# Patient Record
Sex: MALE | Race: WHITE
[De-identification: names, ages, dates, MRNs, and addresses within clinical notes are randomized per-mention and may not be internally consistent; named-entity substitution may affect disease eponyms.]

---

## 2017-04-15 ENCOUNTER — HOSPITAL ENCOUNTER (EMERGENCY)
Dept: HOSPITAL 74 - FER | Age: 27
Discharge: HOME | End: 2017-04-15
Payer: COMMERCIAL

## 2017-04-15 VITALS — HEART RATE: 86 BPM | DIASTOLIC BLOOD PRESSURE: 88 MMHG | SYSTOLIC BLOOD PRESSURE: 131 MMHG | TEMPERATURE: 97.8 F

## 2017-04-15 VITALS — BODY MASS INDEX: 31.4 KG/M2

## 2017-04-15 DIAGNOSIS — S82.831A: Primary | ICD-10-CM

## 2017-04-15 PROCEDURE — 2W3QX1Z IMMOBILIZATION OF RIGHT LOWER LEG USING SPLINT: ICD-10-PCS

## 2017-04-15 NOTE — PDOC
History of Present Illness





- General


Chief Complaint: Pain, Acute


Stated Complaint: RIGHT ANKLE PAIN


Time Seen by Provider: 04/15/17 10:44


History Source: Patient


Exam Limitations: No Limitations





- History of Present Illness


Timing/Duration: 4-6 hours


Severity: moderate


Modifying Factors: improves with: cold therapy


Associated Symptoms: reports: denies symptoms





Past History





- Past Medical History


Allergies/Adverse Reactions: 


 Allergies











Allergy/AdvReac Type Severity Reaction Status Date / Time


 


Penicillins Allergy   Verified 04/15/17 10:33











Home Medications: 


Ambulatory Orders





Ondansetron [Ondansetron Odt] 8 mg PO TID #30 tab.rapdis 04/15/17 


Oxycodone HCl/Acetaminophen [Percocet 5-325 mg Tablet] 1 - 2 tab PO Q4H #20 

tablet MDD 6 04/15/17 








Other medical history: DENIES





- Psycho/Social/Smoking Cessation Hx


Anxiety: No


Suicidal Ideation: No


Smoking History: Never smoked


Hx Alcohol Use: No


Drug/Substance Use Hx: No


Substance Use Type: None





**Review of Systems





- Review of Systems


Able to Perform ROS?: Yes


Is the patient limited English proficient: No


Constitutional: No: Symptoms Reported


HEENTM: No: Symptoms Reported


Respiratory: No: Symptoms reported


Cardiac (ROS): No: Symptoms Reported


ABD/GI: No: Symptoms Reported


: No: Symptoms Reported


Musculoskeletal: Yes: See HPI


Integumentary: Yes: See HPI


Neurological: No: Symptoms reported


Psychiatric: Yes: Anxiety, Depression


Endocrine: No: Symptoms Reported


Hematologic/Lymphatic: No: Symptoms Reported


All Other Systems: Reviewed and Negative





*Physical Exam





- Vital Signs


 Last Vital Signs











Temp Pulse Resp BP Pulse Ox


 


 97.8 F   86   16   131/88   98 


 


 04/15/17 10:32  04/15/17 10:32  04/15/17 10:32  04/15/17 10:32  04/15/17 10:32














- Physical Exam


General Appearance: Yes: Nourished


HEENT: positive: EOMI, ANDREA, Normal ENT Inspection, Normal Voice


Neck: positive: Trachea midline, Supple.  negative: Tender


Respiratory/Chest: positive: Lungs Clear, Normal Breath Sounds.  negative: 

Chest Tender


Cardiovascular: positive: Regular Rhythm, Regular Rate.  negative: Murmur


Gastrointestinal/Abdominal: positive: Normal Bowel Sounds, Flat, Soft.  negative

: Tender


Rectal Exam: positive: deferred


Lymphatic: negative: Adenopathy, Tenderness


Musculoskeletal: positive: Other (Lots of Lateral Soft Tissue Selling/ 

Discoloration.  Patient is expedriencing enough pain so that he can not really 

submit to testing for ligamentous integrity.)


Extremity: positive: Normal Capillary Refill, Tender.  negative: Coldness, 

Cyanosis


Integumentary: positive: Ecchymosis, Bruising





Medical Decision Making





- Medical Decision Making





04/15/17 11:35


Posterior Splint applied without incident.





*DC/Admit/Observation/Transfer


Diagnosis at time of Disposition: 


Fracture of distal fibula


Qualifiers:


 Encounter type: initial encounter Fracture type: closed Laterality: right 





- Discharge Dispostion


Disposition: HOME


Condition at time of disposition: Stable





- Prescriptions


Prescriptions: 


Oxycodone HCl/Acetaminophen [Percocet 5-325 mg Tablet] 1 - 2 tab PO Q4H #20 

tablet MDD 6


Ondansetron [Ondansetron Odt] 8 mg PO TID #30 tab.rapdis





- Referrals


Referrals: 


Angela Olivares MD [Primary Care Provider] - 


Kevin Dinero MD [Staff Physician] - 





- Patient Instructions


Printed Discharge Instructions:  DI for Ankle Fracture


Additional Instructions: 


Ace-





So Sorry that you broke your ankle.  LEAVE THE SPLINT ON until the orthopec 

doctor removes it when you go for follow up..  Return to us if any problems 

wwhatsoever.








Aime-


Dr. Amanuel White





- Post Discharge Activity


Work/School Note:  Back to Work

## 2017-05-31 ENCOUNTER — HOSPITAL ENCOUNTER (EMERGENCY)
Dept: HOSPITAL 74 - FER | Age: 27
Discharge: HOME | End: 2017-05-31
Payer: COMMERCIAL

## 2017-05-31 VITALS — DIASTOLIC BLOOD PRESSURE: 86 MMHG | TEMPERATURE: 98 F | SYSTOLIC BLOOD PRESSURE: 139 MMHG | HEART RATE: 104 BPM

## 2017-05-31 VITALS — BODY MASS INDEX: 33.3 KG/M2

## 2017-05-31 DIAGNOSIS — R19.7: Primary | ICD-10-CM

## 2017-05-31 LAB
ALBUMIN SERPL-MCNC: 3.9 G/DL (ref 3.4–5)
ALP SERPL-CCNC: 165 U/L (ref 45–117)
ALT SERPL-CCNC: 43 U/L (ref 12–78)
ANION GAP SERPL CALC-SCNC: 16 MMOL/L (ref 8–16)
AST SERPL-CCNC: 22 U/L (ref 15–37)
BASOPHILS # BLD: 0.8 % (ref 0–2)
BILIRUB SERPL-MCNC: 0.5 MG/DL (ref 0.2–1)
CALCIUM SERPL-MCNC: 9.3 MG/DL (ref 8.5–10.1)
CO2 SERPL-SCNC: 21 MMOL/L (ref 21–32)
COCKROFT - GAULT: 117.59
CREAT SERPL-MCNC: 1.2 MG/DL (ref 0.7–1.3)
DEPRECATED RDW RBC AUTO: 15.4 % (ref 11.9–15.9)
EOSINOPHIL # BLD: 0.1 % (ref 0–4.5)
GLUCOSE SERPL-MCNC: 100 MG/DL (ref 74–106)
INR BLD: 1.32 (ref 0.82–1.09)
MCH RBC QN AUTO: 26.4 PG (ref 25.7–33.7)
MCHC RBC AUTO-ENTMCNC: 32.2 G/DL (ref 32–35.9)
MCV RBC: 81.8 FL (ref 80–96)
NEUTROPHILS # BLD: 79.9 % (ref 42.8–82.8)
PLATELET # BLD AUTO: 175 K/MM3 (ref 134–434)
PMV BLD: 10.7 FL (ref 7.5–11.1)
PROT SERPL-MCNC: 8.2 G/DL (ref 6.4–8.2)
PT PNL PPP: 14.6 SEC (ref 9.98–11.88)
WBC # BLD AUTO: 17.8 K/MM3 (ref 4–10)

## 2017-05-31 NOTE — PDOC
History of Present Illness





- General


Chief Complaint: Diarrhea


Stated Complaint: DIARRHEA X 24HRS


Time Seen by Provider: 05/31/17 00:58


History Source: Patient


Exam Limitations: No Limitations





- History of Present Illness


Initial Comments: 





05/31/17 01:50


This is a 27-year-old male who comes in with his mother for evaluation of 

diarrhea 1 day. Patient said symptoms began shortly post eating some chicken 

and a hamburger at a picnic that was outdoors. Patient said that the chicken 

and hamburger at a spoiled taste but he ate them anyway. Shortly thereafter 

developed some abdominal cramps and diarrhea. Which progressed to a low-grade 

fever and bloody diarrhea.  Patient denies any nausea or vomiting.





PAST MEDICAL HISTORY:  no significant history





PAST SURGICAL HISTORY:  no significant history





FAMILY HISTORY:  no pertinant history





SOCIAL HISTORY:  Pt lives with  family and is employed.





MEDICATIONS:  reviewed





ALLERGIES:  As per nursing notes





Review of Systems


General:  No fevers or chills, no weakness, no weight loss 


HEENT: No change in vision.  No sore throat,. No ear pain


CardioVascular:  No chest pain or shortness of breath


Respiratory:No cough, or wheezing. 


Gastrointestinal:  no nausea, vomitting, + bloody diarrhea no constipation,  No 

rectal bleeding


Genitourinary:  No dysuria, hematuria, or frequency


Musculoskeletal:  No joint or muscle pain or swelling


Neurologic: No headache, vertigo, dizziness or loss of consciousness


Psychiatric: nor depression 


Skin: No rashes or easy bruising


Endocrine: no increased thirst or abnormal weight change


Allergic: no skin or latex allergy


All other systems reviewed and normal








Exam:





General: Well-nourished well-developed individual, no acute distress


HEENT: Throat: Normal, tonsils normal, no erythema or exudate


               Neck: Supple, no meningeal signs, no lymphadenopathy


Eyes::Pupils equal reactive and round, extraocular motion intact


Chest: Nontender to palpation 


Cardiac: S1-S2 normal, regular rate and rhythm, no murmurs rubs or gallops


Respiratory: Lungs clear to auscultation bilateral


Abdomen: Soft, nondistended, normal bowel sounds, nontender to palpation 

diffusely


Extremities: Warm, dry, no cyanosis, clubbing, or edema


Skin: No rashes


Neuro: Alert and oriented x3, nonfocal exam, grossly intact, normal gait


Psych: Normal mood and affect











Past History





- Past Medical History


Allergies/Adverse Reactions: 


 Allergies











Allergy/AdvReac Type Severity Reaction Status Date / Time


 


Penicillins Allergy   Verified 05/31/17 00:54











Home Medications: 


Ambulatory Orders





NK [No Known Home Medication]  05/31/17 











- Psycho/Social/Smoking Cessation Hx


Anxiety: No


Suicidal Ideation: No


Smoking History: Never smoked


Hx Alcohol Use: No


Drug/Substance Use Hx: No


Substance Use Type: None





*DC/Admit/Observation/Transfer





- Discharge Dispostion


Condition at time of disposition: Stable

## 2020-03-27 PROBLEM — Z00.00 ENCOUNTER FOR PREVENTIVE HEALTH EXAMINATION: Status: ACTIVE | Noted: 2020-03-27

## 2020-03-30 ENCOUNTER — APPOINTMENT (OUTPATIENT)
Dept: FAMILY MEDICINE | Facility: CLINIC | Age: 30
End: 2020-03-30
Payer: MEDICAID

## 2020-03-30 ENCOUNTER — RECORD ABSTRACTING (OUTPATIENT)
Age: 30
End: 2020-03-30

## 2020-03-30 VITALS
HEART RATE: 88 BPM | HEIGHT: 66 IN | SYSTOLIC BLOOD PRESSURE: 122 MMHG | RESPIRATION RATE: 16 BRPM | WEIGHT: 200 LBS | BODY MASS INDEX: 32.14 KG/M2 | TEMPERATURE: 98.9 F | OXYGEN SATURATION: 98 % | DIASTOLIC BLOOD PRESSURE: 76 MMHG

## 2020-03-30 DIAGNOSIS — M06.4 INFLAMMATORY POLYARTHROPATHY: ICD-10-CM

## 2020-03-30 DIAGNOSIS — J30.9 ALLERGIC RHINITIS, UNSPECIFIED: ICD-10-CM

## 2020-03-30 DIAGNOSIS — R76.8 OTHER SPECIFIED ABNORMAL IMMUNOLOGICAL FINDINGS IN SERUM: ICD-10-CM

## 2020-03-30 PROCEDURE — 96372 THER/PROPH/DIAG INJ SC/IM: CPT

## 2020-03-30 PROCEDURE — 99213 OFFICE O/P EST LOW 20 MIN: CPT | Mod: 25

## 2020-03-30 RX ORDER — FEXOFENADINE HCL 180 MG
180 TABLET ORAL
Refills: 0 | Status: ACTIVE | COMMUNITY

## 2020-03-30 RX ORDER — ACETAMINOPHEN 325 MG/1
325 TABLET, FILM COATED ORAL
Refills: 0 | Status: ACTIVE | COMMUNITY

## 2020-03-30 RX ORDER — METHYLPRED ACET/NACL,ISO-OS/PF 80 MG/ML
80 VIAL (ML) INJECTION
Qty: 1 | Refills: 0 | Status: COMPLETED | OUTPATIENT
Start: 2020-03-30

## 2020-03-30 RX ADMIN — Medication 0 MG/ML: at 00:00

## 2020-03-30 NOTE — PLAN
[FreeTextEntry1] : Increase fluids\par Received 80 mg of depomedrol\par Rx valacyclovir\par Telemedicine on Wednesday

## 2020-03-30 NOTE — HISTORY OF PRESENT ILLNESS
[FreeTextEntry8] : Rash on the left side of the waist x 7 days\par Did not improve on local steroids\par No Pain , some itchiness

## 2020-03-30 NOTE — HEALTH RISK ASSESSMENT
[No falls in past year] : Patient reported no falls in the past year [0] : 2) Feeling down, depressed, or hopeless: Not at all (0) [[id number]] : 0

## 2020-04-02 ENCOUNTER — APPOINTMENT (OUTPATIENT)
Dept: FAMILY MEDICINE | Facility: CLINIC | Age: 30
End: 2020-04-02

## 2020-04-02 PROCEDURE — 99213 OFFICE O/P EST LOW 20 MIN: CPT

## 2020-04-06 ENCOUNTER — APPOINTMENT (OUTPATIENT)
Dept: FAMILY MEDICINE | Facility: CLINIC | Age: 30
End: 2020-04-06
Payer: MEDICAID

## 2020-04-06 VITALS
SYSTOLIC BLOOD PRESSURE: 122 MMHG | HEART RATE: 72 BPM | DIASTOLIC BLOOD PRESSURE: 78 MMHG | RESPIRATION RATE: 16 BRPM | BODY MASS INDEX: 32.14 KG/M2 | OXYGEN SATURATION: 98 % | WEIGHT: 200 LBS | HEIGHT: 66 IN | TEMPERATURE: 98.9 F

## 2020-04-06 PROCEDURE — 99213 OFFICE O/P EST LOW 20 MIN: CPT

## 2020-04-06 NOTE — PLAN
[FreeTextEntry1] : Improving\par Continue on current regimen of medication\par phone call in 3 days\par

## 2020-05-04 ENCOUNTER — APPOINTMENT (OUTPATIENT)
Dept: FAMILY MEDICINE | Facility: CLINIC | Age: 30
End: 2020-05-04
Payer: MEDICAID

## 2020-05-04 VITALS
HEIGHT: 66 IN | SYSTOLIC BLOOD PRESSURE: 116 MMHG | DIASTOLIC BLOOD PRESSURE: 70 MMHG | OXYGEN SATURATION: 98 % | WEIGHT: 212 LBS | HEART RATE: 77 BPM | BODY MASS INDEX: 34.07 KG/M2 | TEMPERATURE: 97.1 F

## 2020-05-04 DIAGNOSIS — R21 RASH AND OTHER NONSPECIFIC SKIN ERUPTION: ICD-10-CM

## 2020-05-04 DIAGNOSIS — E66.9 OBESITY, UNSPECIFIED: ICD-10-CM

## 2020-05-04 DIAGNOSIS — H53.009 UNSPECIFIED AMBLYOPIA, UNSPECIFIED EYE: ICD-10-CM

## 2020-05-04 PROCEDURE — 99213 OFFICE O/P EST LOW 20 MIN: CPT

## 2020-05-04 RX ORDER — VALACYCLOVIR 1 G/1
1 TABLET, FILM COATED ORAL 3 TIMES DAILY
Qty: 24 | Refills: 1 | Status: DISCONTINUED | COMMUNITY
Start: 2020-03-30 | End: 2020-05-04

## 2020-05-04 RX ORDER — SULFAMETHOXAZOLE AND TRIMETHOPRIM 800; 160 MG/1; MG/1
800-160 TABLET ORAL TWICE DAILY
Qty: 20 | Refills: 0 | Status: DISCONTINUED | COMMUNITY
Start: 2020-04-03 | End: 2020-05-04

## 2020-05-04 RX ORDER — FERRIC OXIDE RED 80; 80 MG/ML; MG/ML
8-8 LOTION TOPICAL 3 TIMES DAILY
Qty: 1 | Refills: 3 | Status: DISCONTINUED | COMMUNITY
Start: 2020-04-02 | End: 2020-05-04

## 2020-05-04 NOTE — HISTORY OF PRESENT ILLNESS
[FreeTextEntry1] : Concerned of Lazy eyes and f/u on rash [de-identified] : Reports some eyelid drop few days ago, now resolved\par Body rash almost resolved

## 2020-10-23 ENCOUNTER — APPOINTMENT (OUTPATIENT)
Dept: FAMILY MEDICINE | Facility: CLINIC | Age: 30
End: 2020-10-23
Payer: MEDICAID

## 2020-10-23 VITALS
SYSTOLIC BLOOD PRESSURE: 122 MMHG | HEIGHT: 66 IN | DIASTOLIC BLOOD PRESSURE: 70 MMHG | BODY MASS INDEX: 32.14 KG/M2 | RESPIRATION RATE: 16 BRPM | HEART RATE: 78 BPM | TEMPERATURE: 98.7 F | OXYGEN SATURATION: 99 % | WEIGHT: 200 LBS

## 2020-10-23 DIAGNOSIS — R19.15 OTHER ABNORMAL BOWEL SOUNDS: ICD-10-CM

## 2020-10-23 PROCEDURE — 99072 ADDL SUPL MATRL&STAF TM PHE: CPT

## 2020-10-23 PROCEDURE — 99213 OFFICE O/P EST LOW 20 MIN: CPT | Mod: 25

## 2020-10-23 NOTE — PLAN
[FreeTextEntry1] : Unremarkable examination\par Reassurance\par If the sounds persist recommended to see [facility]

## 2020-10-23 NOTE — REVIEW OF SYSTEMS
[Abdominal Pain] : no abdominal pain [Nausea] : no nausea [Constipation] : no constipation [Diarrhea] : no diarrhea [Vomiting] : no vomiting [Heartburn] : no heartburn [Melena] : no melena [Negative] : Respiratory [FreeTextEntry7] : RLQ gurgling

## 2020-10-23 NOTE — HISTORY OF PRESENT ILLNESS
[FreeTextEntry8] : The patient requested to be seen because gurgling in the right lower abdominal quadrant\par Denies nausea , vomiting, abdominal pain or distension\par no fever